# Patient Record
Sex: MALE | Race: ASIAN | NOT HISPANIC OR LATINO
[De-identification: names, ages, dates, MRNs, and addresses within clinical notes are randomized per-mention and may not be internally consistent; named-entity substitution may affect disease eponyms.]

---

## 2022-02-14 PROBLEM — Z00.00 ENCOUNTER FOR PREVENTIVE HEALTH EXAMINATION: Status: ACTIVE | Noted: 2022-02-14

## 2022-02-15 ENCOUNTER — APPOINTMENT (OUTPATIENT)
Dept: SURGERY | Facility: CLINIC | Age: 37
End: 2022-02-15
Payer: MEDICAID

## 2022-02-15 VITALS
DIASTOLIC BLOOD PRESSURE: 94 MMHG | HEART RATE: 83 BPM | SYSTOLIC BLOOD PRESSURE: 141 MMHG | HEIGHT: 67 IN | OXYGEN SATURATION: 98 % | BODY MASS INDEX: 22.91 KG/M2 | WEIGHT: 146 LBS

## 2022-02-15 VITALS — TEMPERATURE: 93.4 F

## 2022-02-15 PROCEDURE — 99203 OFFICE O/P NEW LOW 30 MIN: CPT

## 2022-02-15 NOTE — HISTORY OF PRESENT ILLNESS
[de-identified] : RADHA HAQ is a 36 year old male who present in the office for initial consultation who was referred by Dr. Ofelia Roy with the chief complaint of having multiple soft tissue masses. Patient has had the condition for long time and it getting bigger. Patient has multiple soft tissue masses Left arm, 5, Right arm 2, right leg 2, left leg 3, abdominal wall 2, total of 14. Mass is -mobile, Smooth, non-tender, Well defined. Superficial. No palpable lymph nodes. Biggest Mass size is 8 cm. We recommend an excision of multiple soft tissue masses. He agrees to proceed. \par

## 2022-02-15 NOTE — PLAN
[FreeTextEntry1] : Mr. RADHA HAQ Patient was told significance of findings, options, risks and benefits were explained. Informed consent for excision of multiple soft tissue masses.  Left arm, 5 ( biggest 8 cm), Right arm 2, right leg 2, left leg 3, abdominal wall 2, total of 14. and potential risks, benefits and alternatives (surgical options were discussed including non-surgical options or the option of no surgery) to the planned surgery were discussed in depth. All surgical options were discussed including non-surgical treatments. The patient wishes to proceed with surgery. We will plan for surgery on at the next available date, pending any required insurance pre-certification or pre-approval. Patient agrees to obtain any necessary pre-operative evaluations and testing prior to surgery.\par Patient advised to seek immediate medical attention with any acute change in symptoms or with the development of any new or worsening symptoms. Patient's questions and concerns addressed to patient's satisfaction, and patient verbalized an understanding of the information discussed.\par \par Will schedule for the surgery at [facility] [place].\par \par PST\par COVID\par Labs

## 2022-02-15 NOTE — PHYSICAL EXAM
[Normal Breath Sounds] : Normal breath sounds [Normal Heart Sounds] : normal heart sounds [Normal Rate and Rhythm] : normal rate and rhythm [Alert] : alert [Oriented to Person] : oriented to person [Oriented to Place] : oriented to place [Oriented to Time] : oriented to time [Calm] : calm [de-identified] : The patient is alert, well-groomed  [de-identified] : Head is normocephalic. Conjunctiva pink, anicteric. Nasal mucosa pink, septum midline. Oral mucosa pink. Tongue midline, pharynx without exudates.  [de-identified] : Normoactive bowel sounds, soft and nontender, no hepatosplenomegaly or masses noted, Patient has soft tissue masses [de-identified] : full range of motion and no deformities appreciated.  [de-identified] : Mass is -mobile, Smooth, non-tender, Well defined. Superficial. No palpable lymph nodes. Biggest Mass left arm size is 8 cm

## 2022-02-15 NOTE — ASSESSMENT
[FreeTextEntry1] : RADHA HAQ is a 36 year old male with multiple soft tissue masses \par \par Physical examination findings were discussed in detail. He was informed of significance of findings. All of the options, risks and benefits were explained. \par \par We recommend an excision of multiple soft tissue masses.  Left arm, 5 ( biggest 8 cm), Right arm 2, right leg 2, left leg 3, abdominal wall 2, total of 14.

## 2022-02-15 NOTE — CONSULT LETTER
[Dear  ___] : Dear  [unfilled], [Consult Letter:] : I had the pleasure of evaluating your patient, [unfilled]. [Consult Closing:] : Thank you very much for allowing me to participate in the care of this patient.  If you have any questions, please do not hesitate to contact me. [Sincerely,] : Sincerely, [FreeTextEntry3] : Dr Mujica

## 2022-02-16 LAB
ALBUMIN SERPL ELPH-MCNC: 5 G/DL
ALP BLD-CCNC: 99 U/L
ALT SERPL-CCNC: 29 U/L
ANION GAP SERPL CALC-SCNC: 12 MMOL/L
APTT BLD: 31 SEC
AST SERPL-CCNC: 21 U/L
BASOPHILS # BLD AUTO: 0.05 K/UL
BASOPHILS NFR BLD AUTO: 0.9 %
BILIRUB SERPL-MCNC: 0.5 MG/DL
BUN SERPL-MCNC: 8 MG/DL
CALCIUM SERPL-MCNC: 9.9 MG/DL
CHLORIDE SERPL-SCNC: 105 MMOL/L
CO2 SERPL-SCNC: 23 MMOL/L
CREAT SERPL-MCNC: 1.03 MG/DL
EOSINOPHIL # BLD AUTO: 0.08 K/UL
EOSINOPHIL NFR BLD AUTO: 1.5 %
GLUCOSE SERPL-MCNC: 99 MG/DL
HCT VFR BLD CALC: 47.2 %
HGB BLD-MCNC: 15.9 G/DL
IMM GRANULOCYTES NFR BLD AUTO: 0.4 %
INR PPP: 0.99 RATIO
LYMPHOCYTES # BLD AUTO: 1.98 K/UL
LYMPHOCYTES NFR BLD AUTO: 35.9 %
MAN DIFF?: NORMAL
MCHC RBC-ENTMCNC: 29.6 PG
MCHC RBC-ENTMCNC: 33.7 GM/DL
MCV RBC AUTO: 87.7 FL
MONOCYTES # BLD AUTO: 0.28 K/UL
MONOCYTES NFR BLD AUTO: 5.1 %
NEUTROPHILS # BLD AUTO: 3.1 K/UL
NEUTROPHILS NFR BLD AUTO: 56.2 %
PLATELET # BLD AUTO: 236 K/UL
POTASSIUM SERPL-SCNC: 4.1 MMOL/L
PROT SERPL-MCNC: 7.7 G/DL
PT BLD: 11.7 SEC
RBC # BLD: 5.38 M/UL
RBC # FLD: 12.2 %
SODIUM SERPL-SCNC: 140 MMOL/L
WBC # FLD AUTO: 5.51 K/UL

## 2022-02-22 ENCOUNTER — TRANSCRIPTION ENCOUNTER (OUTPATIENT)
Age: 37
End: 2022-02-22

## 2022-02-22 ENCOUNTER — OUTPATIENT (OUTPATIENT)
Dept: OUTPATIENT SERVICES | Facility: HOSPITAL | Age: 37
LOS: 1 days | End: 2022-02-22
Payer: MEDICAID

## 2022-02-22 VITALS
RESPIRATION RATE: 18 BRPM | OXYGEN SATURATION: 99 % | HEIGHT: 66 IN | SYSTOLIC BLOOD PRESSURE: 120 MMHG | DIASTOLIC BLOOD PRESSURE: 81 MMHG | HEART RATE: 86 BPM | TEMPERATURE: 99 F | WEIGHT: 143.08 LBS

## 2022-02-22 DIAGNOSIS — M79.89 OTHER SPECIFIED SOFT TISSUE DISORDERS: ICD-10-CM

## 2022-02-22 DIAGNOSIS — Z91.89 OTHER SPECIFIED PERSONAL RISK FACTORS, NOT ELSEWHERE CLASSIFIED: ICD-10-CM

## 2022-02-22 DIAGNOSIS — Z01.818 ENCOUNTER FOR OTHER PREPROCEDURAL EXAMINATION: ICD-10-CM

## 2022-02-22 DIAGNOSIS — K21.9 GASTRO-ESOPHAGEAL REFLUX DISEASE WITHOUT ESOPHAGITIS: ICD-10-CM

## 2022-02-22 DIAGNOSIS — Z98.890 OTHER SPECIFIED POSTPROCEDURAL STATES: Chronic | ICD-10-CM

## 2022-02-22 LAB — SARS-COV-2 N GENE NPH QL NAA+PROBE: NOT DETECTED

## 2022-02-22 PROCEDURE — G0463: CPT

## 2022-02-22 NOTE — H&P PST ADULT - PROBLEM SELECTOR PLAN 3
Instructed to continue Omeprazole and to take it with sips of water on day of surgery. Follow-up with provider for management.

## 2022-02-22 NOTE — H&P PST ADULT - GASTROINTESTINAL COMMENTS
c/o rectal pain after bowel movement c/o rectal pain after bowel movement-pt to be evaluated after surgery by GI

## 2022-02-22 NOTE — H&P PST ADULT - ASSESSMENT
This is a 36 year old [facility] male with PMH of GERD who presents with multiple soft tissue masses. Pt is scheduled for excision of multiple soft tissue masses of left arm x5, right arm x 2, right leg x 2, left leg x 3, abdominal wall x 2 on 2/23/2022.   PRASANNA stop bang score 2. Pt denies history of PRASANNA, never did sleep study, is at low risk for sleep apnea and is at low risk for pulmonary complications.

## 2022-02-22 NOTE — H&P PST ADULT - NSICDXPASTMEDICALHX_GEN_ALL_CORE_FT
PAST MEDICAL HISTORY:  GERD (gastroesophageal reflux disease)     Soft tissue mass of right arm, left arm, right leg, left leg, abdominal wall

## 2022-02-22 NOTE — H&P PST ADULT - PROBLEM SELECTOR PLAN 1
Excision of multiple soft tissue masses of left arm x 5, right arm x 2, right leg x 2, left leg x 3, abdominal wall x 2 on 2/23/2022. Pt optimized for surgery by PCP. COVID-19 test on 2/21/2022=negative  Preoperative instructions discussed with pt and given to pt. Instructed pt to notify security when he arrives in the lobby of the hospital that he is here for surgery, that he will need someone to come to the hospital to pick him up after surgery, not to eat or drink anything after midnight the night before the surgery, to avoid NSAIDs such as Ibuprofen, Motrin, Aleve, Advil, naproxen before surgery, to take Tylenol if needed for pain, to report if he has been exposed to anyone with any contagious diseases including Covid-19 or if he is exhibiting any symptoms of COVID-19. Instructed about use of Chlorhexidine 4% soap before coming to hospital the morning of the surgery. Verbalized understanding of instructions given.

## 2022-02-22 NOTE — H&P PST ADULT - HISTORY OF PRESENT ILLNESS
This is a 36 year old [facility] male with PMH of GERD who presents with c/o masses to both arms and abdomen for years that are increasing in size. Pt is scheduled for excision of multiple soft tissue masses of left arm x5, right arm x 2, right leg x 2, left leg x 3, abdominal wall x 2 on 2/23/2022.

## 2022-02-22 NOTE — H&P PST ADULT - PROBLEM SELECTOR PLAN 2
PRASANNA stop bang score 2. Pt denies history of PRASANNA, never did sleep study, is at low risk for sleep apnea and is at low risk for pulmonary complications. Perioperative pulmonary precautions to be maintained.

## 2022-02-23 ENCOUNTER — OUTPATIENT (OUTPATIENT)
Dept: OUTPATIENT SERVICES | Facility: HOSPITAL | Age: 37
LOS: 1 days | End: 2022-02-23
Payer: MEDICAID

## 2022-02-23 ENCOUNTER — APPOINTMENT (OUTPATIENT)
Dept: SURGERY | Facility: HOSPITAL | Age: 37
End: 2022-02-23

## 2022-02-23 ENCOUNTER — RESULT REVIEW (OUTPATIENT)
Age: 37
End: 2022-02-23

## 2022-02-23 VITALS
RESPIRATION RATE: 18 BRPM | HEART RATE: 91 BPM | SYSTOLIC BLOOD PRESSURE: 137 MMHG | WEIGHT: 143.08 LBS | OXYGEN SATURATION: 100 % | TEMPERATURE: 99 F | HEIGHT: 66 IN | DIASTOLIC BLOOD PRESSURE: 87 MMHG

## 2022-02-23 VITALS
RESPIRATION RATE: 18 BRPM | HEART RATE: 84 BPM | SYSTOLIC BLOOD PRESSURE: 148 MMHG | TEMPERATURE: 98 F | OXYGEN SATURATION: 100 % | DIASTOLIC BLOOD PRESSURE: 86 MMHG

## 2022-02-23 DIAGNOSIS — Z98.890 OTHER SPECIFIED POSTPROCEDURAL STATES: Chronic | ICD-10-CM

## 2022-02-23 DIAGNOSIS — M79.89 OTHER SPECIFIED SOFT TISSUE DISORDERS: ICD-10-CM

## 2022-02-23 PROCEDURE — 27632 EXC LEG/ANKLE LES SC 3 CM/>: CPT

## 2022-02-23 PROCEDURE — 24071 EXC ARM/ELBOW LES SC 3 CM/>: CPT

## 2022-02-23 PROCEDURE — 21555 EXC NECK LES SC < 3 CM: CPT | Mod: 59

## 2022-02-23 PROCEDURE — 21552 EXC NECK LES SC 3 CM/>: CPT

## 2022-02-23 PROCEDURE — 21556 EXC NECK TUM DEEP < 5 CM: CPT

## 2022-02-23 PROCEDURE — 24076 EX ARM/ELBOW TUM DEEP < 5 CM: CPT | Mod: XS

## 2022-02-23 PROCEDURE — 27337 EXC THIGH/KNEE LES SC 3 CM/>: CPT

## 2022-02-23 PROCEDURE — 88305 TISSUE EXAM BY PATHOLOGIST: CPT | Mod: 26

## 2022-02-23 PROCEDURE — 27618 EXC LEG/ANKLE TUM < 3 CM: CPT | Mod: 50,59

## 2022-02-23 PROCEDURE — 24075 EXC ARM/ELBOW LES SC < 3 CM: CPT | Mod: 50,59

## 2022-02-23 PROCEDURE — 88305 TISSUE EXAM BY PATHOLOGIST: CPT

## 2022-02-23 RX ORDER — ACETAMINOPHEN 500 MG
2 TABLET ORAL
Qty: 0 | Refills: 0 | DISCHARGE

## 2022-02-23 RX ORDER — ACETAMINOPHEN 500 MG
100 TABLET ORAL
Qty: 0 | Refills: 0 | DISCHARGE
Start: 2022-02-23

## 2022-02-23 RX ORDER — FENTANYL CITRATE 50 UG/ML
25 INJECTION INTRAVENOUS
Refills: 0 | Status: DISCONTINUED | OUTPATIENT
Start: 2022-02-23 | End: 2022-02-23

## 2022-02-23 RX ORDER — ONDANSETRON 8 MG/1
4 TABLET, FILM COATED ORAL ONCE
Refills: 0 | Status: DISCONTINUED | OUTPATIENT
Start: 2022-02-23 | End: 2022-02-23

## 2022-02-23 RX ORDER — OXYCODONE HYDROCHLORIDE 5 MG/1
1 TABLET ORAL
Qty: 5 | Refills: 0
Start: 2022-02-23

## 2022-02-23 RX ORDER — SODIUM CHLORIDE 9 MG/ML
3 INJECTION INTRAMUSCULAR; INTRAVENOUS; SUBCUTANEOUS EVERY 8 HOURS
Refills: 0 | Status: DISCONTINUED | OUTPATIENT
Start: 2022-02-23 | End: 2022-02-23

## 2022-02-23 RX ORDER — SODIUM CHLORIDE 9 MG/ML
1000 INJECTION, SOLUTION INTRAVENOUS
Refills: 0 | Status: DISCONTINUED | OUTPATIENT
Start: 2022-02-23 | End: 2022-02-23

## 2022-02-23 RX ORDER — ACETAMINOPHEN 500 MG
1000 TABLET ORAL ONCE
Refills: 0 | Status: COMPLETED | OUTPATIENT
Start: 2022-02-23 | End: 2022-02-23

## 2022-02-23 RX ORDER — FENTANYL CITRATE 50 UG/ML
50 INJECTION INTRAVENOUS
Refills: 0 | Status: DISCONTINUED | OUTPATIENT
Start: 2022-02-23 | End: 2022-02-23

## 2022-02-23 RX ORDER — OMEPRAZOLE 10 MG/1
1 CAPSULE, DELAYED RELEASE ORAL
Qty: 0 | Refills: 0 | DISCHARGE

## 2022-02-23 RX ADMIN — Medication 1000 MILLIGRAM(S): at 15:20

## 2022-02-23 RX ADMIN — Medication 400 MILLIGRAM(S): at 15:10

## 2022-02-23 RX ADMIN — SODIUM CHLORIDE 3 MILLILITER(S): 9 INJECTION INTRAMUSCULAR; INTRAVENOUS; SUBCUTANEOUS at 10:55

## 2022-02-23 NOTE — ASU DISCHARGE PLAN (ADULT/PEDIATRIC) - NS MD DC FALL RISK RISK
For information on Fall & Injury Prevention, visit: [email or web address] OR  [email or web address] OR  https://www.cdc.gov/steadi/patient.html

## 2022-02-23 NOTE — BRIEF OPERATIVE NOTE - NSICDXBRIEFPOSTOP_GEN_ALL_CORE_FT
POST-OP DIAGNOSIS:  Arm mass, left 23-Feb-2022 18:59:26  Yoel Turner  Arm mass, right 23-Feb-2022 18:59:23  Yoel Turner  Mass of torso 23-Feb-2022 18:59:16  Yoel Turner

## 2022-02-23 NOTE — ASU PATIENT PROFILE, ADULT - FALL HARM RISK - UNIVERSAL INTERVENTIONS
Bed in lowest position, wheels locked, appropriate side rails in place/Call bell, personal items and telephone in reach/Instruct patient to call for assistance before getting out of bed or chair/Non-slip footwear when patient is out of bed/[place] to call system/Physically safe environment - no spills, clutter or unnecessary equipment/Purposeful Proactive Rounding/Room/bathroom lighting operational, light cord in reach

## 2022-02-23 NOTE — BRIEF OPERATIVE NOTE - NSICDXBRIEFPREOP_GEN_ALL_CORE_FT
PRE-OP DIAGNOSIS:  Mass of torso 23-Feb-2022 18:57:24  Yoel Turner  Arm mass, right 23-Feb-2022 18:58:55  Yoel Turner  Arm mass, left 23-Feb-2022 18:59:04  Yoel Turner  Mass of right lower extremity 23-Feb-2022 19:11:04  Yoel Turner  Mass of left lower extremity 23-Feb-2022 19:11:27  Yoel Turner

## 2022-02-23 NOTE — ASU DISCHARGE PLAN (ADULT/PEDIATRIC) - CARE PROVIDER_API CALL
Leonardo Mujica (MD)  Surgery  [address] [place] [address]  Phone: [phone]  Fax: [phone]  Follow Up Time:

## 2022-02-28 PROBLEM — M79.89 OTHER SPECIFIED SOFT TISSUE DISORDERS: Chronic | Status: ACTIVE | Noted: 2022-02-22

## 2022-02-28 PROBLEM — K21.9 GASTRO-ESOPHAGEAL REFLUX DISEASE WITHOUT ESOPHAGITIS: Chronic | Status: ACTIVE | Noted: 2022-02-22

## 2022-02-28 LAB — SURGICAL PATHOLOGY STUDY: SIGNIFICANT CHANGE UP

## 2022-03-08 ENCOUNTER — APPOINTMENT (OUTPATIENT)
Dept: SURGERY | Facility: CLINIC | Age: 37
End: 2022-03-08
Payer: MEDICAID

## 2022-03-08 VITALS
DIASTOLIC BLOOD PRESSURE: 75 MMHG | HEART RATE: 83 BPM | BODY MASS INDEX: 22.6 KG/M2 | WEIGHT: 144 LBS | SYSTOLIC BLOOD PRESSURE: 122 MMHG | HEIGHT: 67 IN

## 2022-03-08 VITALS — TEMPERATURE: 97.1 F

## 2022-03-08 DIAGNOSIS — M79.89 OTHER SPECIFIED SOFT TISSUE DISORDERS: ICD-10-CM

## 2022-03-08 DIAGNOSIS — Z78.9 OTHER SPECIFIED HEALTH STATUS: ICD-10-CM

## 2022-03-08 PROCEDURE — 99024 POSTOP FOLLOW-UP VISIT: CPT

## 2022-03-08 NOTE — CONSULT LETTER
[Dear  ___] : Dear  [unfilled], [Courtesy Letter:] : I had the pleasure of seeing your patient, [unfilled], in my office today. [Consult Closing:] : Thank you very much for allowing me to participate in the care of this patient.  If you have any questions, please do not hesitate to contact me. [Sincerely,] : Sincerely, [FreeTextEntry3] : Dr Mujica

## 2022-03-08 NOTE — HISTORY OF PRESENT ILLNESS
[de-identified] : RADHA HAQ is a 36 year old male who presents in the office for postop visit. He underwent a excision of multiple soft tissue masses on 02/23/2022. 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 Today patient is doing well, offers no complaints. Denies any fevers, chills, nausea, vomiting, diarrhea or constipation. Patient able to tolerate regular diet with normal bowel movements. Surgical incisions are healing well. No sign of inflammation or exudate. \par

## 2022-03-08 NOTE — ASSESSMENT
[FreeTextEntry1] : RADHA HAQ is a 36 year old male who underwent an  excision of multiple soft tissue masses on 02/23/2022. 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 All surgical incisions are healing well and as expected. There is no evidence of infection or complication, and patient is progressing as expected. Pathology results were discussed in detail. Patient's questions and concerns addressed to patient's satisfaction. \par

## 2022-03-08 NOTE — PHYSICAL EXAM
[Normal Breath Sounds] : Normal breath sounds [Normal Heart Sounds] : normal heart sounds [Normal Rate and Rhythm] : normal rate and rhythm [Alert] : alert [Oriented to Person] : oriented to person [Oriented to Place] : oriented to place [Oriented to Time] : oriented to time [Calm] : calm [de-identified] : The patient is alert, well-groomed  [de-identified] : full range of motion and no deformities appreciated.  [de-identified] : Incision sites are healing well

## 2023-11-10 NOTE — ASU PREOP CHECKLIST - AS BP NONINV SITE
P/w feeling suicidal  States he has been dealing with back pain for months  1-2 weeks ago,back pain worsened  Says symptoms were so bad he had to quit his job  Saw ortho today, was told he would need surgery  Pt says he feels this is too much  No active plan to kill himself.   Hx of suicidal ideation for years  Takes clonazepam as prescribed.    left upper arm

## 2025-04-07 NOTE — ASU PREOP CHECKLIST - WARM FLUIDS/WARM BLANKETS
Hide Include Location In Plan Question?: No Additional Note: Reassured benign and normal findings. Detail Level: Zone Male no

## (undated) DEVICE — BLANKET WARMER LOWER ADULT

## (undated) DEVICE — SUT MONOSOF 3-0 18" P-12

## (undated) DEVICE — ELCTR PENCIL NEPTUNE SMOKE EVACUATION

## (undated) DEVICE — NDL HYPO SAFE 25G X 1.5"

## (undated) DEVICE — GLV 7 PROTEXIS

## (undated) DEVICE — NDL HYPO REGULAR BEVEL 25G X 1.5"

## (undated) DEVICE — BLANKET WARMER UPPER ADULT

## (undated) DEVICE — DRAPE LAPAROTOMY W POUCHES

## (undated) DEVICE — GLV 7.5 PROTEXIS

## (undated) DEVICE — DRAPE SPLIT SHEETS 77X120"

## (undated) DEVICE — PACK MINOR NO DRAPE

## (undated) DEVICE — SUT POLYSORB 3-0 30" V-20

## (undated) DEVICE — ELCTR GROUNDING PAD ADULT COVIDIEN

## (undated) DEVICE — SUT MONOCRYL 4-0 27" PS-2 UNDYED

## (undated) DEVICE — DRSG TEGADERM 4X4.75"

## (undated) DEVICE — GOWN XL

## (undated) DEVICE — SUT POLYSORB 4-0 18" P-12 UNDYED

## (undated) DEVICE — SOL IRR POUR NS 0.9% 1500ML

## (undated) DEVICE — WRAP COMPRESSION CALF MED

## (undated) DEVICE — DRSG 4X4

## (undated) DEVICE — SYR LUER LOK 10CC

## (undated) DEVICE — SUT POLYSORB 4-0 27" P-12 UNDYED

## (undated) DEVICE — SUT POLYSORB 2-0 18" TIES UNDYED

## (undated) DEVICE — DRAPE HALF SHEET 40X57"

## (undated) DEVICE — DRAPE LIGHT HANDLE COVER BLUE

## (undated) DEVICE — DRSG MASTISOL

## (undated) DEVICE — SUT BIOSYN 3-0 18" P-12

## (undated) DEVICE — FOR-ESU VALLEYLAB T7E14830DX: Type: DURABLE MEDICAL EQUIPMENT